# Patient Record
Sex: MALE | Race: OTHER | NOT HISPANIC OR LATINO | Employment: OTHER | URBAN - METROPOLITAN AREA
[De-identification: names, ages, dates, MRNs, and addresses within clinical notes are randomized per-mention and may not be internally consistent; named-entity substitution may affect disease eponyms.]

---

## 2018-02-26 PROBLEM — N20.0 KIDNEY STONES: Status: ACTIVE | Noted: 2018-02-26

## 2018-03-26 PROBLEM — I10 ESSENTIAL HYPERTENSION: Status: ACTIVE | Noted: 2018-03-26

## 2018-06-27 PROBLEM — N28.9 RENAL INSUFFICIENCY: Status: ACTIVE | Noted: 2018-06-27

## 2019-02-05 ENCOUNTER — PATIENT OUTREACH (OUTPATIENT)
Dept: HEALTH INFORMATION MANAGEMENT | Facility: OTHER | Age: 79
End: 2019-02-05

## 2019-02-05 NOTE — PROGRESS NOTES
Patient declined to schedule annual wellness because he already has a short appointment scheduled.

## 2020-08-28 PROBLEM — K81.0 GANGRENOUS CHOLECYSTITIS: Status: ACTIVE | Noted: 2020-08-28

## 2020-09-01 PROBLEM — K81.0 GANGRENOUS CHOLECYSTITIS: Status: RESOLVED | Noted: 2020-08-28 | Resolved: 2020-09-01
